# Patient Record
Sex: FEMALE | Race: WHITE
[De-identification: names, ages, dates, MRNs, and addresses within clinical notes are randomized per-mention and may not be internally consistent; named-entity substitution may affect disease eponyms.]

---

## 2017-01-23 ENCOUNTER — HOSPITAL ENCOUNTER (EMERGENCY)
Age: 47
LOS: 1 days | Discharge: HOME | End: 2017-01-24

## 2017-01-23 DIAGNOSIS — F17.210: ICD-10-CM

## 2017-01-23 DIAGNOSIS — R10.84: Primary | ICD-10-CM

## 2017-01-23 PROCEDURE — 71010: CPT

## 2017-01-23 PROCEDURE — 85025 COMPLETE CBC W/AUTO DIFF WBC: CPT

## 2017-01-23 PROCEDURE — 76705 ECHO EXAM OF ABDOMEN: CPT

## 2017-01-23 PROCEDURE — 83690 ASSAY OF LIPASE: CPT

## 2017-01-23 PROCEDURE — 80053 COMPREHEN METABOLIC PANEL: CPT

## 2017-01-23 PROCEDURE — 81003 URINALYSIS AUTO W/O SCOPE: CPT

## 2017-01-23 PROCEDURE — 74176 CT ABD & PELVIS W/O CONTRAST: CPT

## 2017-05-30 ENCOUNTER — HOSPITAL ENCOUNTER (OUTPATIENT)
Dept: HOSPITAL 10 - GIL | Age: 47
Discharge: HOME | End: 2017-05-30
Attending: INTERNAL MEDICINE
Payer: COMMERCIAL

## 2017-05-30 VITALS
WEIGHT: 161.6 LBS | BODY MASS INDEX: 28.63 KG/M2 | WEIGHT: 161.6 LBS | HEIGHT: 63 IN | HEIGHT: 63 IN | BODY MASS INDEX: 28.63 KG/M2

## 2017-05-30 VITALS — RESPIRATION RATE: 21 BRPM | DIASTOLIC BLOOD PRESSURE: 69 MMHG | SYSTOLIC BLOOD PRESSURE: 143 MMHG | HEART RATE: 75 BPM

## 2017-05-30 VITALS — HEART RATE: 69 BPM | SYSTOLIC BLOOD PRESSURE: 140 MMHG | RESPIRATION RATE: 15 BRPM | DIASTOLIC BLOOD PRESSURE: 77 MMHG

## 2017-05-30 DIAGNOSIS — K22.10: ICD-10-CM

## 2017-05-30 DIAGNOSIS — K29.30: Primary | ICD-10-CM

## 2017-05-30 DIAGNOSIS — K57.90: ICD-10-CM

## 2017-05-30 PROCEDURE — 88312 SPECIAL STAINS GROUP 1: CPT

## 2017-05-30 PROCEDURE — 43239 EGD BIOPSY SINGLE/MULTIPLE: CPT

## 2017-05-30 PROCEDURE — 88305 TISSUE EXAM BY PATHOLOGIST: CPT

## 2017-05-30 PROCEDURE — 45378 DIAGNOSTIC COLONOSCOPY: CPT

## 2017-05-30 NOTE — GILP
DATE OF PROCEDURE:  05/30/2017

 

 

PROCEDURE PERFORMED:  

1.  Esophagogastroduodenoscopy with biopsy.

2.  Colonoscopy.  

 

SURGEON:  Elmira Gama MD 

 

INDICATION:  A 47-year-old female undergoing this procedure for abdominal pain.  The purpose is to e
valuate the upper GI, lower GI tract, find out the cause of her chronic symptoms.

 

INFORMED CONSENT:  The risk of the procedure, related and unrelated complications, anesthetic risks,
 alternatives discussed and informed consent was obtained.

 

DESCRIPTION OF PROCEDURE:  The patient was brought to the GI lab, sedated by the anesthesiologist.  
After optimal sedation, scope was passed with much ease into the esophagus which showed esophageal m
oniliasis.  The patient also had an esophageal ulcer, LA class B.  It was a 2 to 3 cm in length.  Z-
line was regular and it was at 35 cm.  Stomach mucosa revealed gastritis.  Four biopsies obtained to
 rule out H. pylori infection.  Duodenum first and second part were within normal limits.  Retrovers
ion done in the stomach which was normal.  Scope was straightened out and removed with good patient 
tolerance.

 

IMPRESSION:

1.  Esophageal moniliasis.

2.  Z-line at 35 cm.

3.  Esophageal ulcer.

4.  Gastritis.

5.  Normal duodenum.

 

PLAN:  Review the histopathology.  In the interim, patient will be placed on nystatin or fluconazole
 tablet. 

 

COLONOSCOPY:  The patient was turned around, scope was passed with much ease into rectum, advanced t
hrough sigmoid, descending, transverse colon all the way into cecum.  IC valve and appendiceal orifi
ce identified.  Those were covered with a thin layer of the stool, but grossly normal.  Rest of the 
colon appeared normal.  Few diverticula seen on the right side and very small mild diverticula seen 
on the left side of the colon.  Retroversion done, no growth seen.  Scope was straightened out and r
emoved with good patient tolerance.  

 

IMPRESSION:  

1.  Diverticulosis, mild both right and left side of the colon, otherwise negative all the way into 
cecum.

2.  Clarity was good.

3.  Cleanliness was adequate.

4.  Retroversion was normal.

 

PLAN:  To stay on high-fiber diet.  Since the patient has a dilated biliary system on CAT scan, I wi
ll ask for MRCP and hopefully the insurance company approves it.

 

 

Dictated By: ELMIRA CASTILLO/DERRICK

DD:    05/30/2017 08:55:07

DT:    05/30/2017 11:27:04

Conf#: [id number]

DID#:  [id number]

CC: ELMIRA GAMA MD;*[facility]*

## 2018-06-07 ENCOUNTER — HOSPITAL ENCOUNTER (EMERGENCY)
Dept: HOSPITAL 91 - FTE | Age: 48
Discharge: HOME | End: 2018-06-07
Payer: COMMERCIAL

## 2018-06-07 ENCOUNTER — HOSPITAL ENCOUNTER (EMERGENCY)
Age: 48
Discharge: HOME | End: 2018-06-07

## 2018-06-07 DIAGNOSIS — M65.841: Primary | ICD-10-CM

## 2018-06-07 DIAGNOSIS — F17.210: ICD-10-CM

## 2018-06-07 PROCEDURE — 73130 X-RAY EXAM OF HAND: CPT

## 2018-06-07 PROCEDURE — 29130 APPL FINGER SPLINT STATIC: CPT

## 2018-06-07 PROCEDURE — 73080 X-RAY EXAM OF ELBOW: CPT

## 2018-06-07 PROCEDURE — 99284 EMERGENCY DEPT VISIT MOD MDM: CPT

## 2018-06-28 ENCOUNTER — HOSPITAL ENCOUNTER (EMERGENCY)
Age: 48
Discharge: HOME | End: 2018-06-28

## 2018-06-28 ENCOUNTER — HOSPITAL ENCOUNTER (EMERGENCY)
Dept: HOSPITAL 91 - FTE | Age: 48
Discharge: HOME | End: 2018-06-28
Payer: COMMERCIAL

## 2018-06-28 DIAGNOSIS — S69.91XA: Primary | ICD-10-CM

## 2018-06-28 DIAGNOSIS — F17.210: ICD-10-CM
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
## 2018-06-28 PROCEDURE — 99283 EMERGENCY DEPT VISIT LOW MDM: CPT

## 2018-06-28 PROCEDURE — 73140 X-RAY EXAM OF FINGER(S): CPT

## 2018-06-28 PROCEDURE — 29130 APPL FINGER SPLINT STATIC: CPT

## 2018-06-28 RX ADMIN — HYDROCODONE BITARTRATE AND ACETAMINOPHEN 1 TAB: 5; 325 TABLET ORAL at 17:28

## 2019-08-16 ENCOUNTER — HOSPITAL ENCOUNTER (EMERGENCY)
Dept: HOSPITAL 10 - E/R | Age: 49
Discharge: HOME | End: 2019-08-16
Payer: COMMERCIAL

## 2019-08-16 VITALS
WEIGHT: 167.77 LBS | HEIGHT: 62 IN | WEIGHT: 167.77 LBS | BODY MASS INDEX: 30.87 KG/M2 | BODY MASS INDEX: 30.87 KG/M2 | HEIGHT: 62 IN

## 2019-08-16 VITALS — RESPIRATION RATE: 17 BRPM | DIASTOLIC BLOOD PRESSURE: 72 MMHG | SYSTOLIC BLOOD PRESSURE: 139 MMHG | HEART RATE: 78 BPM

## 2019-08-16 DIAGNOSIS — R40.2362: ICD-10-CM

## 2019-08-16 DIAGNOSIS — R20.2: Primary | ICD-10-CM

## 2019-08-16 DIAGNOSIS — R07.9: ICD-10-CM

## 2019-08-16 DIAGNOSIS — R40.2252: ICD-10-CM

## 2019-08-16 DIAGNOSIS — F17.210: ICD-10-CM

## 2019-08-16 DIAGNOSIS — R40.2142: ICD-10-CM

## 2019-08-16 PROCEDURE — 85025 COMPLETE CBC W/AUTO DIFF WBC: CPT

## 2019-08-16 PROCEDURE — 84484 ASSAY OF TROPONIN QUANT: CPT

## 2019-08-16 PROCEDURE — 71045 X-RAY EXAM CHEST 1 VIEW: CPT

## 2019-08-16 PROCEDURE — 93005 ELECTROCARDIOGRAM TRACING: CPT

## 2019-08-16 PROCEDURE — 80048 BASIC METABOLIC PNL TOTAL CA: CPT

## 2019-08-16 NOTE — ERD
ER Documentation


Chief Complaint


Chief Complaint





NUMBNESS ON LEFT ARM, CHEST WALL PAIN, ONSET 3 DAYS





HPI


49-year-old female no significant past medical history presents to the emergency


room complaining of numbness to the left side of her body.  The patient 


describes intermittent symptoms over the last 3 days.  She states increasing 


social stressors during this timeframe.  She denies any fevers chills facial 


droop slurred speech, ataxia or headache.  No recent fevers chills cough or 


shortness of breath.





ROS


All systems reviewed and are negative except as per history of present illness.





Medications


Home Meds


Active Scripts


Hydrocodone/Acetaminophen (Norco 5-325 Tablet) 1 Each Tablet, 1 TAB PO Q6H PRN 


for PAIN, #7 TAB


   Prov:CHRISTAL PIERRE PA-C         6/28/18


Ibuprofen* (Motrin*) 600 Mg Tab, 600 MG PO Q6, #30 TAB


   Prov:CHRISTAL PIERRE PA-C         6/28/18


Hydrocodone/Acetaminophen (Norco 5-325 Tablet) 1 Each Tablet, 1 TAB PO Q6H PRN 


for PAIN, #15 TAB


   Prov:MILKA GRESHAM MD         6/7/18


Prednisone* (Prednisone*) 20 Mg Tab, 40 MG PO DAILY for 5 Days, TAB


   Prov:MILKA GRESHAM MD         6/7/18


Reported Medications


[Tramadol]   No Conflict Check, PO DAILY


   5/30/17





Allergies


Allergies:  


Coded Allergies:  


     No Known Allergies (Verified  Allergy, Mild, 9/16/15)





PMhx/Soc


Medical and Surgical Hx:  pt denies Surgical Hx


History of Surgery:  No


Anesthesia Reaction:  No


Hx Neurological Disorder:  No


Hx Respiratory Disorders:  No


Hx Cardiac Disorders:  Yes (HLD)


Hx Psychiatric Problems:  No


Hx Miscellaneous Medical Probl:  No


Hx Alcohol Use:  No


Hx Substance Use:  No


Hx Tobacco Use:  Yes


Smoking Status:  Current every day smoker





FmHx


Family History:  No diabetes





Physical Exam


Vitals





Vital Signs


  Date      Temp  Pulse  Resp  B/P (MAP)   Pulse Ox  O2          O2 Flow    FiO2


Time                                                 Delivery    Rate


   8/16/19  97.5     78    17      139/72        99


     10:35                           (94)





Physical Exam


General: Well developed, well nourished, no acute distress


Head: Normocephalic, atraumatic.


Eyes: Pupils equally reactive, EOM intact


ENT: Moist mucous membranes


Neck: Supple, no lymphadenopathy


Respiratory: Lungs clear bilaterally, no distress


Cardiovascular: RRR, no murmurs, rubs, or gallops


Abdominal: Soft, non-tender, non-distended, no peritoneal signs


: Deferred


MSK: No edema, no unilateral swelling, 5/5 strength


Neurologic: Alert and oriented, moving all extremities, normal speech, no focal 


weakness, no cerebellar signs, no sensory deficit to the left shahzad-body


Skin: No rash


Psych: Normal mood


Result Diagram:  


8/16/19 1054                                                                    


            8/16/19 1054





Results 24 hrs





Laboratory Tests


              Test
                                 8/16/19
10:54


              White Blood Count                     10.0 10^3/ul


              Red Blood Count                       4.63 10^6/ul


              Hemoglobin                               13.8 g/dl


              Hematocrit                                  42.6 %


              Mean Corpuscular Volume                    92.0 fl


              Mean Corpuscular Hemoglobin                29.8 pg


              Mean Corpuscular Hemoglobin
Concent     32.4 g/dl 



              Red Cell Distribution Width                 13.1 %


              Platelet Count                         356 10^3/UL


              Mean Platelet Volume                       10.0 fl


              Immature Granulocytes %                    0.300 %


              Neutrophils %                               58.2 %


              Lymphocytes %                               33.2 %


              Monocytes %                                  6.9 %


              Eosinophils %                                0.9 %


              Basophils %                                  0.5 %


              Nucleated Red Blood Cells %            0.0 /100WBC


              Immature Granulocytes #              0.030 10^3/ul


              Neutrophils #                          5.9 10^3/ul


              Lymphocytes #                          3.3 10^3/ul


              Monocytes #                            0.7 10^3/ul


              Eosinophils #                          0.1 10^3/ul


              Basophils #                            0.1 10^3/ul


              Nucleated Red Blood Cells #            0.0 10^3/ul


              Sodium Level                            138 mmol/L


              Potassium Level                         4.4 mmol/L


              Chloride Level                          104 mmol/L


              Carbon Dioxide Level                     25 mmol/L


              Anion Gap                                        9


              Blood Urea Nitrogen                       12 mg/dl


              Creatinine                              0.69 mg/dl


              Est Glomerular Filtrat Rate
mL/min   > 60 mL/min 



              Glucose Level                             81 mg/dl


              Calcium Level                            9.8 mg/dl


              Troponin I                           < 0.012 ng/ml








Procedures/[facility]


EKG, MONITORS, & DIAGNOSTIC IMAGING:


Chest x-ray: I reviewed and interpreted a 1 view of the chest


Mediastinum: No enlargement


Cardiac silhouette: No cardiomegaly


Airspace: Clear lung fields bilaterally without evidence of pneumothorax


Bones: No evidence of fracture








EKG: I reviewed and interpreted a 12-lead EKG. 


Rhythm: Normal sinus rhythm


ST Changes: No contiguous ST segment elevations


T waves: No contiguous T wave inversions


Impression: No evidence of acute cardiac ischemia





LAB INTERPRETATION:


I reviewed the laboratory testing and it shows no evidence of acute process





MEDICAL DECISION MAKING:


The patient's presentation is consistent with nonspecific paresthesia and 


possibly palpitation.  Patient exhibits no clinical signs or symptoms concerning


 for acute stroke syndrome.  The patient notes increasing social stressors and 


anxiety which is the likely etiology to her symptoms.  She has no exertional 


symptoms.  Reassurance was provided.  Again she has a nonfocal neurologic examin


ation with no focal deficits suggestive of stroke syndrome.





ER COURSE:


* Laboratory testing and diagnostic imaging is unrevealing.  Patient provided 


  with reassurance.  At this time the patient can be safely discharged with 


  close primary care follow-up.





CONSULTATION:


None





DISPOSITION PLAN:


The patient does not have an identifiable emergent medical condition that 


warrants inpatient hospitalization at this time.  The patient is deemed safe for


 discharge with outpatient follow-up.





We discussed follow up with the patient's primary care doctor within 24 to 48 


hours as needed.  We also discussed return to the emergency room for worsening 


symptoms or worsening condition.





Outpatient referral: None required





Discharge Medications:


None required





Departure


Diagnosis:  


   Primary Impression:  


   Paresthesia


Condition:  Stable


Patient Instructions:  Paraesthesias





Additional Instructions:  


Call your primary care doctor TOMORROW for an appointment during the next 1 


WEEK.Tell the  that you were referred from this facility.See the doctor


 sooner or return here if your  condition worsens before your appointment time.











XOCHITL SCHNEIDER MD          Aug 16, 2019 13:33